# Patient Record
Sex: FEMALE | NOT HISPANIC OR LATINO | Employment: UNEMPLOYED | URBAN - METROPOLITAN AREA
[De-identification: names, ages, dates, MRNs, and addresses within clinical notes are randomized per-mention and may not be internally consistent; named-entity substitution may affect disease eponyms.]

---

## 2022-09-22 ENCOUNTER — HOSPITAL ENCOUNTER (EMERGENCY)
Facility: CLINIC | Age: 42
Discharge: HOME | End: 2022-09-22
Attending: EMERGENCY MEDICINE | Admitting: EMERGENCY MEDICINE
Payer: COMMERCIAL

## 2022-09-22 VITALS
HEART RATE: 81 BPM | OXYGEN SATURATION: 99 % | DIASTOLIC BLOOD PRESSURE: 78 MMHG | RESPIRATION RATE: 18 BRPM | TEMPERATURE: 98.4 F | SYSTOLIC BLOOD PRESSURE: 136 MMHG

## 2022-09-22 DIAGNOSIS — U07.1 INFECTION DUE TO 2019 NOVEL CORONAVIRUS: ICD-10-CM

## 2022-09-22 LAB
FLUAV RNA SPEC QL NAA+PROBE: NEGATIVE
FLUBV RNA RESP QL NAA+PROBE: NEGATIVE
RSV RNA SPEC NAA+PROBE: NEGATIVE
SARS-COV-2 RNA RESP QL NAA+PROBE: POSITIVE

## 2022-09-22 PROCEDURE — C9803 HOPD COVID-19 SPEC COLLECT: HCPCS

## 2022-09-22 PROCEDURE — 99283 EMERGENCY DEPT VISIT LOW MDM: CPT

## 2022-09-22 PROCEDURE — 87637 SARSCOV2&INF A&B&RSV AMP PRB: CPT | Performed by: EMERGENCY MEDICINE

## 2022-09-22 PROCEDURE — 250N000013 HC RX MED GY IP 250 OP 250 PS 637: Performed by: EMERGENCY MEDICINE

## 2022-09-22 RX ORDER — ACETAMINOPHEN 500 MG
1000 TABLET ORAL ONCE
Status: COMPLETED | OUTPATIENT
Start: 2022-09-22 | End: 2022-09-22

## 2022-09-22 RX ADMIN — ACETAMINOPHEN 1000 MG: 500 TABLET, FILM COATED ORAL at 16:25

## 2022-09-22 ASSESSMENT — ENCOUNTER SYMPTOMS
VOMITING: 0
SORE THROAT: 1
FEVER: 0
COUGH: 1
SHORTNESS OF BREATH: 0

## 2022-09-22 ASSESSMENT — ACTIVITIES OF DAILY LIVING (ADL): ADLS_ACUITY_SCORE: 35

## 2022-09-22 NOTE — ED PROVIDER NOTES
History   Chief Complaint:  COVID Concern      The history is provided by the patient.      Rochelle Wallace is a 41 year old female who presents with a COVID concern. The patient states she has had a scratchy throat and mild cough for the past 2 days. She has been around her mother, who recently tested positive for COVID.  Patient is here in the emergency department with her mother who is a patient and asked that she be checked for COVID-19.  The patient has been taking Allegra and paracetamol as needed. She denies fevers, chest pain, shortness of breath, or emesis. She denies medication allergies or past medical problems. She denies tobacco use. No other complaints. The patient presents with her mother, who is also being evaluated in the emergency department.  The patient reports that she does not want any other tests performed other than the COVID-19 test.    Review of Systems   Constitutional: Negative for fever.   HENT: Positive for sore throat.    Respiratory: Positive for cough. Negative for shortness of breath.    Cardiovascular: Negative for chest pain.   Gastrointestinal: Negative for vomiting.   All other systems reviewed and are negative.    Allergies:  The patient has no known allergies.     Medications:  The patient is not currently taking any prescribed medications.    Past Medical History:     The patient denies past medical history.     Social History:  The patient presents to the ED with her mother.   Tobacco Use: No   PCP: No primary care provider on file.     Physical Exam     Patient Vitals for the past 24 hrs:   BP Temp Temp src Pulse Resp SpO2   09/22/22 1617 136/78 98.4  F (36.9  C) Oral 81 18 99 %     Physical Exam  Nursing note and vitals reviewed.  Constitutional:  Appears well-developed and well-nourished.   HENT:   Head:    Atraumatic.   Mouth/Throat:   Oropharynx is clear and moist. No oropharyngeal exudate.   Eyes:    Pupils are equal, round, and reactive to light.   Neck:    Normal  range of motion. Neck supple.      No tracheal deviation present. No thyromegaly present.   Cardiovascular:  Normal rate, regular rhythm, no murmur   Pulmonary/Chest: Breath sounds are clear and equal without wheezes or crackles.  Abdominal:   Soft. Bowel sounds are normal. Exhibits no distension and      no mass. There is no tenderness.      There is no rebound and no guarding.   Musculoskeletal:  Exhibits no edema.   Lymphadenopathy:  No cervical adenopathy.   Neurological:   Alert and oriented to person, place, and time.   Skin:    Skin is warm and dry. No rash noted. No pallor.     Emergency Department Course   Laboratory:  Labs Ordered and Resulted from Time of ED Arrival to Time of ED Departure - No data to display   Symptomatic Influenza A/B & SARS-CoV2 (COVID19) Virus PCR Multiplex: COVID positive (A!)      Emergency Department Course:     Reviewed:  I reviewed nursing notes and vitals    Assessments:  1547 I obtained history and examined the patient as noted above.   1657 I rechecked the patient. The patient is concerned that the nasal swab went too far and went into her brain. I reassured her. There is no nasal bleeding or discharge. Patient would like to be discharged.   1744 I attempted to call the patient to inform of positive COVID result, but the call did not go through. She had been told prior to leaving the emergency department, that she would have to isolate until her COVID-19 results returned. She was told that she could not go to work for at least 5 days if she is COVID positive.     Interventions:  1625 Tylenol 1000 mg PO    Disposition:  The patient was discharged to home.     Impression & Plan   Medical Decision Making:  Rochelle Wallace is a 41 year old female who presents to the emergency department today with COVID concern. A broad ddx was considered including viral and bacterial causes of infection including URI, pharyngitis, bronchitis, pneumonia, influenza, COVID-19, OM, Strep  pharyngitis, sinus infection, among others. Clinically the patient is well appearing without increased work of breathing, respiratory distress, hypoxia, signs of ARDS or other serious decompensation or complication. Clinical signs and symptoms are not consistent with meningitis or sepsis. The patient has had an exposure to COVID-19. COVID, RSV, and influenza swab was obtained, which returned positive for COVID. Given the lack of serious respiratory symptoms and no clinical findings to suggest pneumonia or pulmonary embolism, XR/CT is not indicated at this time.  The patient refuses any other tests other than the COVID-19 test.  She was discharged prior to the test results returning and she was [facility].  She was told to quarantine until she gets a negative result.  I tried to call her with her test result however the phone number she gave was not a valid number.  I recommended supportive care for treatment of likely underlying viral syndrome including possible COVID-19, influenza and others. Tylenol for fever control. Good oral fluid intake. Discussed the importance of home quarantine and CDC guidelines on self-quarantine were provided as part of discharge instructions. No indication for hospitalization at this time. Return precautions were discussed with patient. The patient's questions were answered and the patient was agreeable with discharge.     Diagnosis:    ICD-10-CM    1. Infection due to 2019 novel coronavirus  U07.1          Scribe Disclosure:  I, China Vega, am serving as a scribe at 3:56 PM on 9/22/2022 to document services personally performed by Carly Alas MD based on my observations and the provider's statements to me.     Carly Alas MD  09/22/22 2059

## 2022-09-22 NOTE — DISCHARGE INSTRUCTIONS
Isolate until after your COVID-19 test result returns. The result will show in your [facility] MY Chart.

## 2022-09-22 NOTE — ED TRIAGE NOTES
Pt presents due to family member that she lives with testing COVID+. C/o sore throat x2 days.

## 2022-09-23 ENCOUNTER — TELEPHONE (OUTPATIENT)
Dept: NURSING | Facility: CLINIC | Age: 42
End: 2022-09-23

## 2022-09-23 NOTE — TELEPHONE ENCOUNTER
Patient classified as COVID treatment eligible by Epic high risk algorithm:  Yes    Coronavirus (COVID-19) Notification    Reason for call  Notify of POSITIVE COVID-19 lab result, assess symptoms,  review [facility] recommendations    Lab Result   Lab test for 2019-nCoV rRt-PCR or SARS-COV-2 PCR  Oropharyngeal AND/OR nasopharyngeal swabs were POSITIVE for 2019-nCoV RNA [OR] SARS-COV-2 RNA (COVID-19) RNA     We have been unable to reach patient by phone at this time to notify of their Positive COVID-19 result.    Unable to leave voicemail message requesting a call back to [phone] [facility] for results.        A Positive COVID-19 letter will be sent via [employer] or the mail.    Nicolette Hernández